# Patient Record
Sex: FEMALE | Race: ASIAN | NOT HISPANIC OR LATINO
[De-identification: names, ages, dates, MRNs, and addresses within clinical notes are randomized per-mention and may not be internally consistent; named-entity substitution may affect disease eponyms.]

---

## 2022-01-24 ENCOUNTER — RESULT REVIEW (OUTPATIENT)
Age: 82
End: 2022-01-24

## 2022-01-26 ENCOUNTER — APPOINTMENT (OUTPATIENT)
Dept: ULTRASOUND IMAGING | Facility: IMAGING CENTER | Age: 82
End: 2022-01-26
Payer: MEDICARE

## 2022-01-26 ENCOUNTER — OUTPATIENT (OUTPATIENT)
Dept: OUTPATIENT SERVICES | Facility: HOSPITAL | Age: 82
LOS: 1 days | End: 2022-01-26
Payer: COMMERCIAL

## 2022-01-26 DIAGNOSIS — Z00.8 ENCOUNTER FOR OTHER GENERAL EXAMINATION: ICD-10-CM

## 2022-01-26 PROCEDURE — C1739: CPT

## 2022-01-26 PROCEDURE — 19281 PERQ DEVICE BREAST 1ST IMAG: CPT | Mod: RT

## 2022-01-26 PROCEDURE — 19281 PERQ DEVICE BREAST 1ST IMAG: CPT

## 2022-01-31 ENCOUNTER — OUTPATIENT (OUTPATIENT)
Dept: OUTPATIENT SERVICES | Facility: HOSPITAL | Age: 82
LOS: 1 days | End: 2022-01-31
Payer: MEDICARE

## 2022-01-31 VITALS
WEIGHT: 160.06 LBS | HEIGHT: 62 IN | SYSTOLIC BLOOD PRESSURE: 120 MMHG | HEART RATE: 87 BPM | OXYGEN SATURATION: 99 % | DIASTOLIC BLOOD PRESSURE: 66 MMHG | TEMPERATURE: 99 F | RESPIRATION RATE: 16 BRPM

## 2022-01-31 DIAGNOSIS — N63.10 UNSPECIFIED LUMP IN THE RIGHT BREAST, UNSPECIFIED QUADRANT: ICD-10-CM

## 2022-01-31 DIAGNOSIS — E11.9 TYPE 2 DIABETES MELLITUS WITHOUT COMPLICATIONS: ICD-10-CM

## 2022-01-31 DIAGNOSIS — Z90.12 ACQUIRED ABSENCE OF LEFT BREAST AND NIPPLE: Chronic | ICD-10-CM

## 2022-01-31 DIAGNOSIS — Z01.812 ENCOUNTER FOR PREPROCEDURAL LABORATORY EXAMINATION: ICD-10-CM

## 2022-01-31 DIAGNOSIS — I10 ESSENTIAL (PRIMARY) HYPERTENSION: ICD-10-CM

## 2022-01-31 PROBLEM — Z00.00 ENCOUNTER FOR PREVENTIVE HEALTH EXAMINATION: Status: ACTIVE | Noted: 2022-01-31

## 2022-01-31 LAB
A1C WITH ESTIMATED AVERAGE GLUCOSE RESULT: 6.7 % — HIGH (ref 4–5.6)
ANION GAP SERPL CALC-SCNC: 10 MMOL/L — SIGNIFICANT CHANGE UP (ref 7–14)
BUN SERPL-MCNC: 20 MG/DL — SIGNIFICANT CHANGE UP (ref 7–23)
CALCIUM SERPL-MCNC: 9.5 MG/DL — SIGNIFICANT CHANGE UP (ref 8.4–10.5)
CHLORIDE SERPL-SCNC: 102 MMOL/L — SIGNIFICANT CHANGE UP (ref 98–107)
CO2 SERPL-SCNC: 25 MMOL/L — SIGNIFICANT CHANGE UP (ref 22–31)
CREAT SERPL-MCNC: 1.03 MG/DL — SIGNIFICANT CHANGE UP (ref 0.5–1.3)
ESTIMATED AVERAGE GLUCOSE: 146 — SIGNIFICANT CHANGE UP
GLUCOSE SERPL-MCNC: 59 MG/DL — LOW (ref 70–99)
HCT VFR BLD CALC: 40.4 % — SIGNIFICANT CHANGE UP (ref 34.5–45)
HGB BLD-MCNC: 13.1 G/DL — SIGNIFICANT CHANGE UP (ref 11.5–15.5)
MCHC RBC-ENTMCNC: 29.5 PG — SIGNIFICANT CHANGE UP (ref 27–34)
MCHC RBC-ENTMCNC: 32.4 GM/DL — SIGNIFICANT CHANGE UP (ref 32–36)
MCV RBC AUTO: 91 FL — SIGNIFICANT CHANGE UP (ref 80–100)
NRBC # BLD: 0 /100 WBCS — SIGNIFICANT CHANGE UP
NRBC # FLD: 0 K/UL — SIGNIFICANT CHANGE UP
PLATELET # BLD AUTO: 196 K/UL — SIGNIFICANT CHANGE UP (ref 150–400)
POTASSIUM SERPL-MCNC: 4.3 MMOL/L — SIGNIFICANT CHANGE UP (ref 3.5–5.3)
POTASSIUM SERPL-SCNC: 4.3 MMOL/L — SIGNIFICANT CHANGE UP (ref 3.5–5.3)
RBC # BLD: 4.44 M/UL — SIGNIFICANT CHANGE UP (ref 3.8–5.2)
RBC # FLD: 12.8 % — SIGNIFICANT CHANGE UP (ref 10.3–14.5)
SODIUM SERPL-SCNC: 137 MMOL/L — SIGNIFICANT CHANGE UP (ref 135–145)
WBC # BLD: 8.58 K/UL — SIGNIFICANT CHANGE UP (ref 3.8–10.5)
WBC # FLD AUTO: 8.58 K/UL — SIGNIFICANT CHANGE UP (ref 3.8–10.5)

## 2022-01-31 PROCEDURE — 93010 ELECTROCARDIOGRAM REPORT: CPT

## 2022-01-31 NOTE — H&P PST ADULT - PROBLEM SELECTOR PLAN 2
Pt instructed to take  on morning of surgery. Pt instructed to take amlodipine on morning of surgery.  LANDRY precautions recommended.  Pt with multiple comorbidities - medical eval requested  Pending copy of report & copy of most recent ECHO

## 2022-01-31 NOTE — H&P PST ADULT - NSICDXPASTMEDICALHX_GEN_ALL_CORE_FT
PAST MEDICAL HISTORY:  Dyslipidemia     Hypertension     Malignant neoplasm of breast h/o left - NO B/P NO BLOOD DRAW NO IV LEFT ARM    Overactive bladder     Type 2 diabetes mellitus     Unspecified lump in the right breast, unspecified quadrant

## 2022-01-31 NOTE — H&P PST ADULT - HISTORY OF PRESENT ILLNESS
82y/o female presents for preop eval for scheduled biopsy right breast with ETHAN  localization.   82y/o female presents for preop eval for scheduled biopsy right breast with ETHAN  localization.  Pt states routine mammogram few months ago with abnormal finding in right breast.  Repeat mammogram and sonogram done and referred to breast specialist.  MRI and biopsy done pt states benign.  Preop dx unspecified lump in the right breast.

## 2022-01-31 NOTE — H&P PST ADULT - PROBLEM SELECTOR PLAN 3
Fs on admit  Pt  instructed last dose on Fs on admit  Pt  instructed last dose Januvia & glimepiride on 2/6/22

## 2022-01-31 NOTE — H&P PST ADULT - NSANTHOSAYNRD_GEN_A_CORE
No. LANDRY screening performed.  STOP BANG Legend: 0-2 = LOW Risk; 3-4 = INTERMEDIATE Risk; 5-8 = HIGH Risk

## 2022-01-31 NOTE — H&P PST ADULT - PROBLEM SELECTOR PLAN 1
Written & verbal preop instructions, gi prophylaxis & surgical soap given  Pt verbalized good understanding.  Teach back done on surgical soap instructions. Scheduled for biopsy right breast with ETHAN  localization on 2/7/2022.  Written & verbal preop instructions, gi prophylaxis & surgical soap given  Pt verbalized good understanding.  Teach back done on surgical soap instructions.

## 2022-01-31 NOTE — H&P PST ADULT - NSICDXFAMILYHX_GEN_ALL_CORE_FT
FAMILY HISTORY:  Family history of hypertension    Father  Still living? No  FHx: stroke, Age at diagnosis: Age Unknown

## 2022-02-02 ENCOUNTER — APPOINTMENT (OUTPATIENT)
Dept: MAMMOGRAPHY | Facility: IMAGING CENTER | Age: 82
End: 2022-02-02

## 2022-02-02 PROBLEM — E11.9 TYPE 2 DIABETES MELLITUS WITHOUT COMPLICATIONS: Chronic | Status: ACTIVE | Noted: 2022-01-31

## 2022-02-02 PROBLEM — I10 ESSENTIAL (PRIMARY) HYPERTENSION: Chronic | Status: ACTIVE | Noted: 2022-01-31

## 2022-02-02 PROBLEM — C50.919 MALIGNANT NEOPLASM OF UNSPECIFIED SITE OF UNSPECIFIED FEMALE BREAST: Chronic | Status: ACTIVE | Noted: 2022-01-31

## 2022-02-02 PROBLEM — E78.5 HYPERLIPIDEMIA, UNSPECIFIED: Chronic | Status: ACTIVE | Noted: 2022-01-31

## 2022-02-02 PROBLEM — N32.81 OVERACTIVE BLADDER: Chronic | Status: ACTIVE | Noted: 2022-01-31

## 2022-02-02 PROBLEM — N63.10 UNSPECIFIED LUMP IN THE RIGHT BREAST, UNSPECIFIED QUADRANT: Chronic | Status: ACTIVE | Noted: 2022-01-31

## 2022-02-04 VITALS
OXYGEN SATURATION: 99 % | WEIGHT: 160.06 LBS | HEART RATE: 90 BPM | TEMPERATURE: 98 F | SYSTOLIC BLOOD PRESSURE: 140 MMHG | DIASTOLIC BLOOD PRESSURE: 75 MMHG | HEIGHT: 62 IN | RESPIRATION RATE: 16 BRPM

## 2022-02-04 NOTE — ASU PREOPERATIVE ASSESSMENT, ADULT (IPARK ONLY) - FALL HARM RISK - RISK INTERVENTIONS
Assistance with ambulation/Monitor gait and stability/Reinforce activity limits and safety measures with patient and family/Sit up slowly, dangle for a short time, stand at bedside before walking/Bed in lowest position, wheels locked, appropriate side rails in place/Call bell, personal items and telephone in reach/Instruct patient to call for assistance before getting out of bed or chair/Non-slip footwear when patient is out of bed/[place] to call system/Physically safe environment - no spills, clutter or unnecessary equipment/Purposeful Proactive Rounding/Room/bathroom lighting operational, light cord in reach

## 2022-02-05 ENCOUNTER — TRANSCRIPTION ENCOUNTER (OUTPATIENT)
Age: 82
End: 2022-02-05

## 2022-02-06 ENCOUNTER — TRANSCRIPTION ENCOUNTER (OUTPATIENT)
Age: 82
End: 2022-02-06

## 2022-02-07 ENCOUNTER — OUTPATIENT (OUTPATIENT)
Dept: OUTPATIENT SERVICES | Facility: HOSPITAL | Age: 82
LOS: 1 days | End: 2022-02-07
Payer: COMMERCIAL

## 2022-02-07 ENCOUNTER — RESULT REVIEW (OUTPATIENT)
Age: 82
End: 2022-02-07

## 2022-02-07 ENCOUNTER — APPOINTMENT (OUTPATIENT)
Dept: MAMMOGRAPHY | Facility: IMAGING CENTER | Age: 82
End: 2022-02-07
Payer: MEDICARE

## 2022-02-07 ENCOUNTER — OUTPATIENT (OUTPATIENT)
Dept: OUTPATIENT SERVICES | Facility: HOSPITAL | Age: 82
LOS: 1 days | Discharge: HOME | End: 2022-02-07
Payer: MEDICARE

## 2022-02-07 VITALS
DIASTOLIC BLOOD PRESSURE: 59 MMHG | SYSTOLIC BLOOD PRESSURE: 122 MMHG | TEMPERATURE: 99 F | RESPIRATION RATE: 12 BRPM | HEART RATE: 84 BPM | OXYGEN SATURATION: 96 %

## 2022-02-07 DIAGNOSIS — Z90.12 ACQUIRED ABSENCE OF LEFT BREAST AND NIPPLE: Chronic | ICD-10-CM

## 2022-02-07 DIAGNOSIS — Z00.8 ENCOUNTER FOR OTHER GENERAL EXAMINATION: ICD-10-CM

## 2022-02-07 DIAGNOSIS — N63.10 UNSPECIFIED LUMP IN THE RIGHT BREAST, UNSPECIFIED QUADRANT: ICD-10-CM

## 2022-02-07 LAB — GLUCOSE BLDC GLUCOMTR-MCNC: 111 MG/DL — HIGH (ref 70–99)

## 2022-02-07 PROCEDURE — 88305 TISSUE EXAM BY PATHOLOGIST: CPT | Mod: 26

## 2022-02-07 PROCEDURE — 76098 X-RAY EXAM SURGICAL SPECIMEN: CPT

## 2022-02-07 PROCEDURE — 76098 X-RAY EXAM SURGICAL SPECIMEN: CPT | Mod: 26

## 2022-02-07 RX ORDER — OXYCODONE AND ACETAMINOPHEN 5; 325 MG/1; MG/1
1 TABLET ORAL
Qty: 5 | Refills: 0
Start: 2022-02-07

## 2022-02-07 NOTE — ASU DISCHARGE PLAN (ADULT/PEDIATRIC) - NS MD DC FALL RISK RISK
For information on Fall & Injury Prevention, visit: [email or web address] OR  [email or web address] OR  https://www.cdc.gov/steadi/patient.html

## 2022-02-07 NOTE — ASU DISCHARGE PLAN (ADULT/PEDIATRIC) - CARE PROVIDER_API CALL
Sharonda Coelho  SURGERY  [address] [place] [address]  Phone: [phone]  Fax: [phone]  Follow Up Time: 1 week

## 2022-02-07 NOTE — ASU DISCHARGE PLAN (ADULT/PEDIATRIC) - ASU DC SPECIAL INSTRUCTIONSFT
Patient may apply an ice pack to the right breast for the first 24 hours to help with swelling.     Patient is being prescribed 5 tabs of Percocet which can be taken every 4 hours for severe pain if needed. Otherwise patient may take Tylenol every 6 hours for pain. Do not exceed more than 4000mg of Tylenol in one 24 hour setting.    Patient should keep dressing on and can shower over dressing. Dressing should stay on until patient sees Dr. Coelho in office in 1 week.

## 2022-02-07 NOTE — ASU DISCHARGE PLAN (ADULT/PEDIATRIC) - NURSING INSTRUCTIONS
Progress to regular diet as tolerated.  Keep well hydrated.   When taking pain/narcotic medications - take with food as it can cause nausea if taken on an empty stomach, and know it may cause constipation. To prevent constipation increase fluids and fiber in diet. You may take stool softeners (such as Colace) and follow directions as printed on bottle. - Do NOT drive while on narcotics.

## 2022-02-15 LAB — SURGICAL PATHOLOGY STUDY: SIGNIFICANT CHANGE UP

## 2022-07-26 NOTE — H&P PST ADULT - VASCULAR
detailed exam Minoxidil Counseling: Minoxidil is a topical medication which can increase blood flow where it is applied. It is uncertain how this medication increases hair growth. Side effects are uncommon and include stinging and allergic reactions.

## 2023-04-06 ENCOUNTER — NON-APPOINTMENT (OUTPATIENT)
Age: 83
End: 2023-04-06

## 2023-04-10 ENCOUNTER — APPOINTMENT (OUTPATIENT)
Dept: GYNECOLOGIC ONCOLOGY | Facility: CLINIC | Age: 83
End: 2023-04-10
Payer: MEDICARE

## 2023-04-10 VITALS
HEIGHT: 63 IN | WEIGHT: 168 LBS | BODY MASS INDEX: 29.77 KG/M2 | DIASTOLIC BLOOD PRESSURE: 75 MMHG | HEART RATE: 80 BPM | SYSTOLIC BLOOD PRESSURE: 136 MMHG

## 2023-04-10 DIAGNOSIS — Z78.9 OTHER SPECIFIED HEALTH STATUS: ICD-10-CM

## 2023-04-10 PROCEDURE — 99204 OFFICE O/P NEW MOD 45 MIN: CPT

## 2023-04-10 NOTE — PAST MEDICAL HISTORY
[Total Preg ___] : G[unfilled] [Postmenopausal] : The patient is postmenopausal [Menarche Age ____] : age at menarche was [unfilled] [Menopause Age____] : age at menopause was [unfilled] [Living ___] : Living: [unfilled]

## 2023-04-10 NOTE — HISTORY OF PRESENT ILLNESS
[FreeTextEntry1] : Referred by (GYN) Dr. Bridges\par PCP: Dr. Devicka Persaud\par Breast Surgeon: Dr. Coelho\par \par Ms. KAY is a 82 year old  postmenopausal female, referred from Dr. Bridges, with a new diagnosis of endometrial cancer.  She had vaginal bleeding that started over 4 months ago.  Per patient she had an in office US, revealing a thickened endometrial lining.  \par \par 3/25/23- Endometrial Biopsy- endometrial adenocarcinoma grade 1\par \par PMHx- HLD, diabetes, depression\par PSHx- Left mastectomy for breast cancer in  (no reconstruction), she took Tamoxifen for 2 years\par Family hx of cancer- none\par   \par She denies pelvic pain/pressure. She denies bloating, abdominal distention or change in bowel or urinary habits.  She denies recent weight changes.  She denies nausea/vomiting.  She has vaginal bleeding, but denies vaginal discharge.  She denies all other associated signs and symptoms.  She is here today to discuss further surgical management. \par \par HM\par Pap- 2023- negative, HRHPV (-)\par Mammo-  breast health followed by Dr. Coelho Intraductal papilloma removed in \par Colonoscopy- denies

## 2023-04-10 NOTE — DISCUSSION/SUMMARY
[Reviewed Clinical Lab Test(s)] : Results of clinical tests were reviewed. [Pre Op] : The differential diagnosis was discussed in detail. The indications, risks, benefits and alternatives were discussed. [unfilled] expressed an understanding of the treatment rationale and her questions were answered to her apparent satisfaction. [FreeTextEntry1] : 82 year old with newly diagnosed grade 1 endometrial cancer\par \par I discussed treatment recommendations with Ms. KAY and her daughters in detail.\par I am recommending hysterectomy/BSO/lymphadenectomy, which is standard management of endometrial cancer.  We discussed role of sentinel lymph node mapping.\par \par The plan is  for minimally invasive robotic approach.  Risks of surgery including bleeding/blood transfusion/infection/injury to bowel/bladder/ureter/blood vessels/conversion to laparotomy were discussed.  Alteratives to management include radiation and chemotherapy.  We discussed possibility of postoperative adjuvant therapy, radiation or chemotherapy/radiation.    Postoperative expectations and recovery were discussed in detail.\par \par She will be referred for preoperative medical clearance.  I answered her questions to her apparent satisfaction.  She would like to proceed with surgery and will be scheduled in the near future.\par \par \par

## 2023-04-13 ENCOUNTER — OUTPATIENT (OUTPATIENT)
Dept: OUTPATIENT SERVICES | Facility: HOSPITAL | Age: 83
LOS: 1 days | End: 2023-04-13
Payer: MEDICARE

## 2023-04-13 DIAGNOSIS — R87.619 UNSPECIFIED ABNORMAL CYTOLOGICAL FINDINGS IN SPECIMENS FROM CERVIX UTERI: ICD-10-CM

## 2023-04-13 DIAGNOSIS — N84.1 POLYP OF CERVIX UTERI: ICD-10-CM

## 2023-04-13 DIAGNOSIS — Z90.12 ACQUIRED ABSENCE OF LEFT BREAST AND NIPPLE: Chronic | ICD-10-CM

## 2023-04-17 ENCOUNTER — TRANSCRIPTION ENCOUNTER (OUTPATIENT)
Age: 83
End: 2023-04-17

## 2023-04-17 ENCOUNTER — RESULT REVIEW (OUTPATIENT)
Age: 83
End: 2023-04-17

## 2023-04-17 PROCEDURE — 88321 CONSLTJ&REPRT SLD PREP ELSWR: CPT

## 2023-04-19 LAB — SURGICAL PATHOLOGY STUDY: SIGNIFICANT CHANGE UP

## 2023-04-28 ENCOUNTER — NON-APPOINTMENT (OUTPATIENT)
Age: 83
End: 2023-04-28

## 2023-05-11 ENCOUNTER — APPOINTMENT (OUTPATIENT)
Dept: GYNECOLOGIC ONCOLOGY | Facility: HOSPITAL | Age: 83
End: 2023-05-11

## 2023-06-16 ENCOUNTER — NON-APPOINTMENT (OUTPATIENT)
Age: 83
End: 2023-06-16

## 2023-06-19 ENCOUNTER — NON-APPOINTMENT (OUTPATIENT)
Age: 83
End: 2023-06-19

## 2023-08-03 ENCOUNTER — OUTPATIENT (OUTPATIENT)
Dept: OUTPATIENT SERVICES | Facility: HOSPITAL | Age: 83
LOS: 1 days | End: 2023-08-03
Payer: MEDICARE

## 2023-08-03 VITALS
WEIGHT: 162.92 LBS | DIASTOLIC BLOOD PRESSURE: 67 MMHG | RESPIRATION RATE: 18 BRPM | HEART RATE: 77 BPM | SYSTOLIC BLOOD PRESSURE: 110 MMHG | HEIGHT: 62 IN | OXYGEN SATURATION: 98 % | TEMPERATURE: 97 F

## 2023-08-03 DIAGNOSIS — E11.9 TYPE 2 DIABETES MELLITUS WITHOUT COMPLICATIONS: ICD-10-CM

## 2023-08-03 DIAGNOSIS — C54.1 MALIGNANT NEOPLASM OF ENDOMETRIUM: ICD-10-CM

## 2023-08-03 DIAGNOSIS — N32.81 OVERACTIVE BLADDER: ICD-10-CM

## 2023-08-03 DIAGNOSIS — Z90.12 ACQUIRED ABSENCE OF LEFT BREAST AND NIPPLE: Chronic | ICD-10-CM

## 2023-08-03 DIAGNOSIS — I10 ESSENTIAL (PRIMARY) HYPERTENSION: ICD-10-CM

## 2023-08-03 DIAGNOSIS — Z98.890 OTHER SPECIFIED POSTPROCEDURAL STATES: Chronic | ICD-10-CM

## 2023-08-03 LAB
A1C WITH ESTIMATED AVERAGE GLUCOSE RESULT: 6.5 % — HIGH (ref 4–5.6)
ALBUMIN SERPL ELPH-MCNC: 4.6 G/DL — SIGNIFICANT CHANGE UP (ref 3.3–5)
ALP SERPL-CCNC: 79 U/L — SIGNIFICANT CHANGE UP (ref 40–120)
ALT FLD-CCNC: 17 U/L — SIGNIFICANT CHANGE UP (ref 4–33)
ANION GAP SERPL CALC-SCNC: 13 MMOL/L — SIGNIFICANT CHANGE UP (ref 7–14)
AST SERPL-CCNC: 19 U/L — SIGNIFICANT CHANGE UP (ref 4–32)
BILIRUB SERPL-MCNC: 0.3 MG/DL — SIGNIFICANT CHANGE UP (ref 0.2–1.2)
BLD GP AB SCN SERPL QL: NEGATIVE — SIGNIFICANT CHANGE UP
BUN SERPL-MCNC: 16 MG/DL — SIGNIFICANT CHANGE UP (ref 7–23)
CALCIUM SERPL-MCNC: 9.4 MG/DL — SIGNIFICANT CHANGE UP (ref 8.4–10.5)
CHLORIDE SERPL-SCNC: 104 MMOL/L — SIGNIFICANT CHANGE UP (ref 98–107)
CO2 SERPL-SCNC: 24 MMOL/L — SIGNIFICANT CHANGE UP (ref 22–31)
CREAT SERPL-MCNC: 0.98 MG/DL — SIGNIFICANT CHANGE UP (ref 0.5–1.3)
EGFR: 57 ML/MIN/1.73M2 — LOW
ESTIMATED AVERAGE GLUCOSE: 140 — SIGNIFICANT CHANGE UP
GLUCOSE SERPL-MCNC: 62 MG/DL — LOW (ref 70–99)
HCT VFR BLD CALC: 38.3 % — SIGNIFICANT CHANGE UP (ref 34.5–45)
HGB BLD-MCNC: 12.3 G/DL — SIGNIFICANT CHANGE UP (ref 11.5–15.5)
MCHC RBC-ENTMCNC: 29.6 PG — SIGNIFICANT CHANGE UP (ref 27–34)
MCHC RBC-ENTMCNC: 32.1 GM/DL — SIGNIFICANT CHANGE UP (ref 32–36)
MCV RBC AUTO: 92.1 FL — SIGNIFICANT CHANGE UP (ref 80–100)
NRBC # BLD: 0 /100 WBCS — SIGNIFICANT CHANGE UP (ref 0–0)
NRBC # FLD: 0 K/UL — SIGNIFICANT CHANGE UP (ref 0–0)
PLATELET # BLD AUTO: 175 K/UL — SIGNIFICANT CHANGE UP (ref 150–400)
POTASSIUM SERPL-MCNC: 4.3 MMOL/L — SIGNIFICANT CHANGE UP (ref 3.5–5.3)
POTASSIUM SERPL-SCNC: 4.3 MMOL/L — SIGNIFICANT CHANGE UP (ref 3.5–5.3)
PROT SERPL-MCNC: 8.1 G/DL — SIGNIFICANT CHANGE UP (ref 6–8.3)
RBC # BLD: 4.16 M/UL — SIGNIFICANT CHANGE UP (ref 3.8–5.2)
RBC # FLD: 12.7 % — SIGNIFICANT CHANGE UP (ref 10.3–14.5)
RH IG SCN BLD-IMP: POSITIVE — SIGNIFICANT CHANGE UP
SODIUM SERPL-SCNC: 141 MMOL/L — SIGNIFICANT CHANGE UP (ref 135–145)
WBC # BLD: 7.01 K/UL — SIGNIFICANT CHANGE UP (ref 3.8–10.5)
WBC # FLD AUTO: 7.01 K/UL — SIGNIFICANT CHANGE UP (ref 3.8–10.5)

## 2023-08-03 PROCEDURE — 93010 ELECTROCARDIOGRAM REPORT: CPT

## 2023-08-03 RX ORDER — CHLORHEXIDINE GLUCONATE 213 G/1000ML
1 SOLUTION TOPICAL ONCE
Refills: 0 | Status: DISCONTINUED | OUTPATIENT
Start: 2023-08-10 | End: 2023-08-11

## 2023-08-03 RX ORDER — SITAGLIPTIN 50 MG/1
1 TABLET, FILM COATED ORAL
Qty: 0 | Refills: 0 | DISCHARGE

## 2023-08-03 RX ORDER — SODIUM CHLORIDE 9 MG/ML
1000 INJECTION, SOLUTION INTRAVENOUS
Refills: 0 | Status: DISCONTINUED | OUTPATIENT
Start: 2023-08-10 | End: 2023-08-11

## 2023-08-03 NOTE — H&P PST ADULT - NEGATIVE NEUROLOGICAL SYMPTOMS
WDL
no weakness/no paresthesias/no generalized seizures/no syncope/no tremors/no vertigo/no loss of sensation/no headache

## 2023-08-03 NOTE — H&P PST ADULT - NEGATIVE GENERAL GENITOURINARY SYMPTOMS
no hematuria/no flank pain L/no flank pain R/no dysuria/no urinary hesitancy/normal urinary frequency/no nocturia

## 2023-08-03 NOTE — H&P PST ADULT - NSICDXPASTMEDICALHX_GEN_ALL_CORE_FT
PAST MEDICAL HISTORY:  Dyslipidemia     Hypertension     Malignant neoplasm of breast h/o left - NO B/P NO BLOOD DRAW NO IV LEFT ARM    Malignant neoplasm of endometrium     Osteoarthritis     Overactive bladder     Thyroid nodule     Type 2 diabetes mellitus     Unspecified lump in the right breast, unspecified quadrant

## 2023-08-03 NOTE — H&P PST ADULT - PROBLEM SELECTOR PLAN 1
Patient tentatively scheduled for Robotic assisted total laparoscopic hysterectomy, bilateral salpingo oophorectomy sentinel H node mapping and excision on 8/10/23.  Pre-op instructions provided. Pt given verbal and written instructions with teach back on chlorhexidine shampoo and pepcid. Pt verbalized understanding with return demonstration.     CBC, CMP, T&S, HgbA1c, EKG were done at [facility].  Copy of Echocardiogram and Stress test requested.  Medical evaluation requested (Advanced age, hx of HTN, DM). Patient had an appointment August 2, 2023.

## 2023-08-03 NOTE — H&P PST ADULT - SKIN
Dr. Nino please advise, does patient still need PFT, original order was placed 6/23/21 by Corie Guillen, patient is scheduled to see you 10/12/21, if PFT needed will need to be completed at the hospital  
Regarding service to pulmonary testing order placed 6/23/21. Our PFT machine is down until further notice and we do not have an estimate of when it will be up again at this time. If PFT is still needed, please reorder to be done at the hospital. Thank you.    Message sent to Dr. Nino team as PFT was ordered by Corie Guillen CNP.  
See pulm     Concern for ERAZO, prompting study 6/2021 from JACOB Yusuf    pft when able  
Spoke with patient. Established with Dr. Pickett. Patient will call Dr. Pickett's office to schedule follow up appointment.  Routing message to Dr. Pickett's pool.  
warm and dry/color normal/normal/no rashes/no ulcers

## 2023-08-03 NOTE — H&P PST ADULT - FUNCTIONAL STATUS
METS 4- Able to climb up 2 flights of stairs, walk up hill and do house chores without symptoms./4-10 METS

## 2023-08-03 NOTE — H&P PST ADULT - NEGATIVE ENMT SYMPTOMS
no hearing difficulty/no ear pain/no tinnitus/no vertigo/no sinus symptoms/no nasal congestion/no gum bleeding/no dry mouth/no throat pain/no dysphagia

## 2023-08-03 NOTE — H&P PST ADULT - HISTORY OF PRESENT ILLNESS
82 year old female with pmhx of HTN, DMII, HLD, b/l breast ca s/p left mastectomy (13 years ago), right lumpectomy (2021), presents for pre-op evaluation for diagnosis of Malignant neoplasm of endometrium. Pt is scheduled for Robotic assisted total laparoscopic hysterectomy, bilateral salpingo oophorectomy sentinel H node mapping and excision.  82 year old female with pmhx of HTN, DMII, HLD, b/l breast ca s/p left mastectomy (13 years ago), right lumpectomy (2021), presents for pre-op evaluation for diagnosis of Malignant neoplasm of endometrium. Pt is scheduled for Robotic assisted total laparoscopic hysterectomy, bilateral salpingo oophorectomy sentinel H node mapping and excision. Patient had spotting in March and had Ultrasound which shows endometrial thickening. Patient had a biopsy which shows Endometrial adenocarcinoma grade I.

## 2023-08-04 ENCOUNTER — APPOINTMENT (OUTPATIENT)
Dept: GYNECOLOGIC ONCOLOGY | Facility: CLINIC | Age: 83
End: 2023-08-04
Payer: MEDICARE

## 2023-08-04 VITALS
HEART RATE: 82 BPM | BODY MASS INDEX: 28.17 KG/M2 | SYSTOLIC BLOOD PRESSURE: 131 MMHG | DIASTOLIC BLOOD PRESSURE: 66 MMHG | WEIGHT: 159 LBS

## 2023-08-04 PROCEDURE — 99213 OFFICE O/P EST LOW 20 MIN: CPT

## 2023-08-04 NOTE — DISCUSSION/SUMMARY
[FreeTextEntry1] : Discussed postoperative recovery and expectations Plan for robotic TLH/BSO/SLN Discussed risks/benefits of surgery

## 2023-08-04 NOTE — HISTORY OF PRESENT ILLNESS
[FreeTextEntry1] : 81 yo with FIGO 1 endometrial cancer, initially seen in 4/2023 for consultation  She was scheduled for surgery on 5/11/23, and did not return any calls regarding the surgery.  Contact was finally made in 6/2023 and patient declined surgery or any treatment. She then called back in 7/2023 with desire to schedule surgery, which is scheduled for 8/10/23 No new complaints today

## 2023-08-09 ENCOUNTER — TRANSCRIPTION ENCOUNTER (OUTPATIENT)
Age: 83
End: 2023-08-09

## 2023-08-09 NOTE — ASU PATIENT PROFILE, ADULT - FALL HARM RISK - UNIVERSAL INTERVENTIONS
Bed in lowest position, wheels locked, appropriate side rails in place/Call bell, personal items and telephone in reach/Instruct patient to call for assistance before getting out of bed or chair/Non-slip footwear when patient is out of bed/[place] to call system/Physically safe environment - no spills, clutter or unnecessary equipment/Purposeful Proactive Rounding/Room/bathroom lighting operational, light cord in reach

## 2023-08-09 NOTE — ASU PATIENT PROFILE, ADULT - ABILITY TO HEAR (WITH HEARING AID OR HEARING APPLIANCE IF NORMALLY USED):
mild [place]ild[place] to Moderately Impaired: difficulty hearing in some environments or speaker may need to increase volume or speak distinctly

## 2023-08-10 ENCOUNTER — TRANSCRIPTION ENCOUNTER (OUTPATIENT)
Age: 83
End: 2023-08-10

## 2023-08-10 ENCOUNTER — INPATIENT (INPATIENT)
Facility: HOSPITAL | Age: 83
LOS: 0 days | Discharge: HOME | End: 2023-08-11
Attending: OBSTETRICS & GYNECOLOGY | Admitting: OBSTETRICS & GYNECOLOGY
Payer: MEDICARE

## 2023-08-10 ENCOUNTER — RESULT REVIEW (OUTPATIENT)
Age: 83
End: 2023-08-10

## 2023-08-10 ENCOUNTER — APPOINTMENT (OUTPATIENT)
Dept: GYNECOLOGIC ONCOLOGY | Facility: HOSPITAL | Age: 83
End: 2023-08-10

## 2023-08-10 VITALS
SYSTOLIC BLOOD PRESSURE: 137 MMHG | HEART RATE: 79 BPM | OXYGEN SATURATION: 97 % | HEIGHT: 62 IN | TEMPERATURE: 97 F | RESPIRATION RATE: 18 BRPM | WEIGHT: 162.92 LBS | DIASTOLIC BLOOD PRESSURE: 68 MMHG

## 2023-08-10 DIAGNOSIS — Z98.890 OTHER SPECIFIED POSTPROCEDURAL STATES: Chronic | ICD-10-CM

## 2023-08-10 DIAGNOSIS — C54.1 MALIGNANT NEOPLASM OF ENDOMETRIUM: ICD-10-CM

## 2023-08-10 DIAGNOSIS — Z90.12 ACQUIRED ABSENCE OF LEFT BREAST AND NIPPLE: Chronic | ICD-10-CM

## 2023-08-10 LAB
GLUCOSE BLDC GLUCOMTR-MCNC: 178 MG/DL — HIGH (ref 70–99)
GLUCOSE BLDC GLUCOMTR-MCNC: 182 MG/DL — HIGH (ref 70–99)
GLUCOSE BLDC GLUCOMTR-MCNC: 191 MG/DL — HIGH (ref 70–99)

## 2023-08-10 PROCEDURE — 88307 TISSUE EXAM BY PATHOLOGIST: CPT | Mod: 26

## 2023-08-10 PROCEDURE — 88309 TISSUE EXAM BY PATHOLOGIST: CPT | Mod: 26

## 2023-08-10 PROCEDURE — 38570 LAPAROSCOPY LYMPH NODE BIOP: CPT

## 2023-08-10 PROCEDURE — 88342 IMHCHEM/IMCYTCHM 1ST ANTB: CPT | Mod: 26

## 2023-08-10 PROCEDURE — 88112 CYTOPATH CELL ENHANCE TECH: CPT | Mod: 26

## 2023-08-10 PROCEDURE — 58571 TLH W/T/O 250 G OR LESS: CPT

## 2023-08-10 PROCEDURE — 38900 IO MAP OF SENT LYMPH NODE: CPT | Mod: 50

## 2023-08-10 PROCEDURE — 88341 IMHCHEM/IMCYTCHM EA ADD ANTB: CPT | Mod: 26

## 2023-08-10 PROCEDURE — 58571 TLH W/T/O 250 G OR LESS: CPT | Mod: AS

## 2023-08-10 PROCEDURE — 38571 LAPAROSCOPY LYMPHADENECTOMY: CPT | Mod: AS

## 2023-08-10 RX ORDER — SODIUM CHLORIDE 9 MG/ML
1000 INJECTION, SOLUTION INTRAVENOUS
Refills: 0 | Status: DISCONTINUED | OUTPATIENT
Start: 2023-08-10 | End: 2023-08-11

## 2023-08-10 RX ORDER — ACETAMINOPHEN 500 MG
975 TABLET ORAL EVERY 6 HOURS
Refills: 0 | Status: DISCONTINUED | OUTPATIENT
Start: 2023-08-11 | End: 2023-08-11

## 2023-08-10 RX ORDER — DEXTROSE 50 % IN WATER 50 %
12.5 SYRINGE (ML) INTRAVENOUS ONCE
Refills: 0 | Status: DISCONTINUED | OUTPATIENT
Start: 2023-08-10 | End: 2023-08-11

## 2023-08-10 RX ORDER — SODIUM CHLORIDE 9 MG/ML
3 INJECTION INTRAMUSCULAR; INTRAVENOUS; SUBCUTANEOUS EVERY 8 HOURS
Refills: 0 | Status: DISCONTINUED | OUTPATIENT
Start: 2023-08-10 | End: 2023-08-11

## 2023-08-10 RX ORDER — FENTANYL CITRATE 50 UG/ML
50 INJECTION INTRAVENOUS
Refills: 0 | Status: DISCONTINUED | OUTPATIENT
Start: 2023-08-10 | End: 2023-08-10

## 2023-08-10 RX ORDER — DEXTROSE 50 % IN WATER 50 %
25 SYRINGE (ML) INTRAVENOUS ONCE
Refills: 0 | Status: DISCONTINUED | OUTPATIENT
Start: 2023-08-10 | End: 2023-08-11

## 2023-08-10 RX ORDER — SIMVASTATIN 20 MG/1
1 TABLET, FILM COATED ORAL
Qty: 0 | Refills: 0 | DISCHARGE

## 2023-08-10 RX ORDER — IBUPROFEN 200 MG
600 TABLET ORAL EVERY 6 HOURS
Refills: 0 | Status: DISCONTINUED | OUTPATIENT
Start: 2023-08-10 | End: 2023-08-11

## 2023-08-10 RX ORDER — GLUCAGON INJECTION, SOLUTION 0.5 MG/.1ML
1 INJECTION, SOLUTION SUBCUTANEOUS ONCE
Refills: 0 | Status: DISCONTINUED | OUTPATIENT
Start: 2023-08-10 | End: 2023-08-11

## 2023-08-10 RX ORDER — DEXTROSE 50 % IN WATER 50 %
15 SYRINGE (ML) INTRAVENOUS ONCE
Refills: 0 | Status: DISCONTINUED | OUTPATIENT
Start: 2023-08-10 | End: 2023-08-11

## 2023-08-10 RX ORDER — GLIMEPIRIDE 1 MG
1 TABLET ORAL
Qty: 0 | Refills: 0 | DISCHARGE

## 2023-08-10 RX ORDER — INSULIN LISPRO 100/ML
VIAL (ML) SUBCUTANEOUS AT BEDTIME
Refills: 0 | Status: DISCONTINUED | OUTPATIENT
Start: 2023-08-10 | End: 2023-08-11

## 2023-08-10 RX ORDER — ONDANSETRON 8 MG/1
4 TABLET, FILM COATED ORAL ONCE
Refills: 0 | Status: COMPLETED | OUTPATIENT
Start: 2023-08-10 | End: 2023-08-10

## 2023-08-10 RX ORDER — SENNA PLUS 8.6 MG/1
2 TABLET ORAL AT BEDTIME
Refills: 0 | Status: DISCONTINUED | OUTPATIENT
Start: 2023-08-10 | End: 2023-08-11

## 2023-08-10 RX ORDER — METOPROLOL TARTRATE 50 MG
5 TABLET ORAL EVERY 6 HOURS
Refills: 0 | Status: DISCONTINUED | OUTPATIENT
Start: 2023-08-10 | End: 2023-08-11

## 2023-08-10 RX ORDER — ASPIRIN/CALCIUM CARB/MAGNESIUM 324 MG
1 TABLET ORAL
Refills: 0 | DISCHARGE

## 2023-08-10 RX ORDER — OXYCODONE HYDROCHLORIDE 5 MG/1
5 TABLET ORAL
Refills: 0 | Status: DISCONTINUED | OUTPATIENT
Start: 2023-08-10 | End: 2023-08-11

## 2023-08-10 RX ORDER — ONDANSETRON 8 MG/1
8 TABLET, FILM COATED ORAL EVERY 8 HOURS
Refills: 0 | Status: DISCONTINUED | OUTPATIENT
Start: 2023-08-10 | End: 2023-08-11

## 2023-08-10 RX ORDER — SIMETHICONE 80 MG/1
80 TABLET, CHEWABLE ORAL EVERY 6 HOURS
Refills: 0 | Status: DISCONTINUED | OUTPATIENT
Start: 2023-08-10 | End: 2023-08-11

## 2023-08-10 RX ORDER — INSULIN LISPRO 100/ML
VIAL (ML) SUBCUTANEOUS
Refills: 0 | Status: DISCONTINUED | OUTPATIENT
Start: 2023-08-10 | End: 2023-08-11

## 2023-08-10 RX ORDER — AMLODIPINE AND VALSARTAN 5; 320 MG/1; MG/1
1 TABLET, FILM COATED ORAL
Qty: 0 | Refills: 0 | DISCHARGE

## 2023-08-10 RX ORDER — HEPARIN SODIUM 5000 [USP'U]/ML
5000 INJECTION INTRAVENOUS; SUBCUTANEOUS EVERY 8 HOURS
Refills: 0 | Status: DISCONTINUED | OUTPATIENT
Start: 2023-08-10 | End: 2023-08-11

## 2023-08-10 RX ORDER — SOLIFENACIN SUCCINATE 10 MG/1
1 TABLET ORAL
Qty: 0 | Refills: 0 | DISCHARGE

## 2023-08-10 RX ADMIN — SODIUM CHLORIDE 30 MILLILITER(S): 9 INJECTION, SOLUTION INTRAVENOUS at 21:35

## 2023-08-10 RX ADMIN — OXYCODONE HYDROCHLORIDE 5 MILLIGRAM(S): 5 TABLET ORAL at 22:35

## 2023-08-10 RX ADMIN — OXYCODONE HYDROCHLORIDE 5 MILLIGRAM(S): 5 TABLET ORAL at 21:35

## 2023-08-10 RX ADMIN — Medication 1: at 18:27

## 2023-08-10 RX ADMIN — ONDANSETRON 4 MILLIGRAM(S): 8 TABLET, FILM COATED ORAL at 17:55

## 2023-08-10 RX ADMIN — SODIUM CHLORIDE 3 MILLILITER(S): 9 INJECTION INTRAMUSCULAR; INTRAVENOUS; SUBCUTANEOUS at 22:28

## 2023-08-10 RX ADMIN — FENTANYL CITRATE 50 MICROGRAM(S): 50 INJECTION INTRAVENOUS at 18:40

## 2023-08-10 RX ADMIN — Medication 600 MILLIGRAM(S): at 22:52

## 2023-08-10 RX ADMIN — FENTANYL CITRATE 50 MICROGRAM(S): 50 INJECTION INTRAVENOUS at 19:00

## 2023-08-10 RX ADMIN — Medication 600 MILLIGRAM(S): at 23:52

## 2023-08-10 RX ADMIN — HEPARIN SODIUM 5000 UNIT(S): 5000 INJECTION INTRAVENOUS; SUBCUTANEOUS at 22:52

## 2023-08-10 NOTE — BRIEF OPERATIVE NOTE - ASSISTANT(S)
Barbara Cisse; Azul Sanchez First Assist: Faizan David PA-C  Second Assists: Barbara Cisse; Azul Sanchez

## 2023-08-10 NOTE — BRIEF OPERATIVE NOTE - OPERATION/FINDINGS
Exam under anesthesia revealed anteverted uterus approximately 6 week size. Small right-sided vaginal stricture. Uterus sounded to 9 cm.   Laparoscopy: Atraumatic entry. Grossly normal bowel and liver edge. Uterus anteverted with small right-sided pedunculated fundal fibroid. Grossly normal tubes and ovaries, left tube with paratubal cyst. Right and left parailiac nodes seen under fluorescent imaging. Excellent hemostasis noted. Exam under anesthesia revealed anteverted uterus approximately 6 week size. Small right-sided vaginal stricture. Uterus sounded to 9 cm.   Laparoscopy: Atraumatic entry. Grossly normal bowel and liver edge. Uterus anteverted with small right-sided pedunculated fundal fibroid. Grossly normal tubes and ovaries, right paratubal cyst. Right and left pelvic nodes seen under fluorescent imaging. Excellent hemostasis noted.

## 2023-08-10 NOTE — BRIEF OPERATIVE NOTE - SPECIMENS
Right sentinel lymph node, left sentinel lymph node, uterus with bilateral fallopian tubes and ovaries

## 2023-08-10 NOTE — BRIEF OPERATIVE NOTE - COMMENTS
I, Faizan David PA-C, served as the first assistant in this operation. I assisted in placing ports, docking, and targeting the da Yajaira robot, first assisted at the surgical field while the surgeon was performing the operation at the robotic console by providing instrument exchanges, tissue retraction, suction and irrigation, specimen retrieval, passing and removing sutures and sponges, undocking the robotic platform, and closed surgical wounds.

## 2023-08-10 NOTE — BRIEF OPERATIVE NOTE - DISPOSITION
"Neuro: Disoriented to time/situation. Pupils equal. Sometimes appear \"dysconjugate\" when pt is just waking up but track. Follows commands. Oxy and tylenol given for pain. Melatonin given for sleep per patient request.  CV: Sinus rhythm, occasional PVCs. BP stable. Afebrile but pt states she is cold.   Pulm: Shortness of breath/RR improved around 2100. PA from [facility] team assessed pt and had no new orders/suggestions at this time. Lungs clear/crackles in bases. NC 3L.   GI/: Hypo bowel sounds. Voided small amounts (~25mL) in commode. HD from 5649-8114, 3L taken off.  Skin: Generalized bruising/redness.   Mobility: Up with walker and standby assistance.     Report called to 6B RN. Patient transferred.     " PACU - floor

## 2023-08-10 NOTE — BRIEF OPERATIVE NOTE - NSICDXBRIEFPROCEDURE_GEN_ALL_CORE_FT
PROCEDURES:  Hysterectomy, total, robot-assisted, laparoscopic, using da Yajaira Xi, with bilateral salpingo-oophorectomy and sentinel lymph node biopsy 10-Aug-2023 20:17:16  Azul Sanchez

## 2023-08-10 NOTE — PATIENT PROFILE ADULT - FALL HARM RISK - RISK INTERVENTIONS
Assistance OOB with selected safe patient handling equipment/Assistance with ambulation/Communicate Fall Risk and Risk Factors to all staff, patient, and family/Discuss with provider need for PT consult/Monitor gait and stability/Provide patient with walking aids - walker, cane, crutches/Reinforce activity limits and safety measures with patient and family/Sit up slowly, dangle for a short time, stand at bedside before walking/Use of alarms - bed, chair and/or voice tab/Visual Cue: Yellow wristband/Bed in lowest position, wheels locked, appropriate side rails in place/Call bell, personal items and telephone in reach/Instruct patient to call for assistance before getting out of bed or chair/Non-slip footwear when patient is out of bed/[place] to call system/Physically safe environment - no spills, clutter or unnecessary equipment/Purposeful Proactive Rounding/Room/bathroom lighting operational, light cord in reach

## 2023-08-10 NOTE — CHART NOTE - NSCHARTNOTEFT_GEN_A_CORE
Patient seen and examined at bedside, recently post-op. No acute complaints at this time. Pain is well controlled, had nausea but now relieved by antiemetics. Haines catheter has been in place since surgery. Not yet OOB. Denies CP, SOB, N/V, fevers, and chills.    Vital Signs Last 24 Hours  T(C): 36.9 (08-10-23 @ 19:00), Max: 36.9 (08-10-23 @ 19:00)  HR: 93 (08-10-23 @ 19:00) (79 - 93)  BP: 149/71 (08-10-23 @ 19:00) (126/56 - 149/71)  RR: 20 (08-10-23 @ 19:00) (15 - 20)  SpO2: 100% (08-10-23 @ 19:00) (97% - 100%)    I&O's Summary    10 Aug 2023 07:01  -  10 Aug 2023 19:20  --------------------------------------------------------  IN: 0 mL / OUT: 285 mL / NET: -285 mL      Physical Exam:  General: NAD  CV: RRR  Lungs: normal respiratory effort on room air  Abdomen: Soft, non-tender, non-distended  Incision: 4 LSC port sites CDI with steris and overlying dressings  Ext: No pain or swelling    MEDICATIONS  (STANDING):  chlorhexidine 2% Cloths 1 Application(s) Topical once  dextrose 5%. 1000 milliLiter(s) (100 mL/Hr) IV Continuous <Continuous>  dextrose 5%. 1000 milliLiter(s) (50 mL/Hr) IV Continuous <Continuous>  dextrose 50% Injectable 25 Gram(s) IV Push once  dextrose 50% Injectable 12.5 Gram(s) IV Push once  dextrose 50% Injectable 25 Gram(s) IV Push once  glucagon  Injectable 1 milliGRAM(s) IntraMuscular once  heparin   Injectable 5000 Unit(s) SubCutaneous every 8 hours  ibuprofen  Tablet. 600 milliGRAM(s) Oral every 6 hours  insulin lispro (ADMELOG) corrective regimen sliding scale   SubCutaneous three times a day before meals  insulin lispro (ADMELOG) corrective regimen sliding scale   SubCutaneous at bedtime  lactated ringers. 1000 milliLiter(s) (30 mL/Hr) IV Continuous <Continuous>  sodium chloride 0.9% lock flush 3 milliLiter(s) IV Push every 8 hours    MEDICATIONS  (PRN):  dextrose Oral Gel 15 Gram(s) Oral once PRN Blood Glucose LESS THAN 70 milliGRAM(s)/deciliter  fentaNYL    Injectable 50 MICROGram(s) IV Push every 5 minutes PRN Severe Pain (7 - 10)  metoprolol tartrate Injectable 5 milliGRAM(s) IV Push every 6 hours PRN SBP >/= 160 or DBP >/= 110  ondansetron    Tablet 8 milliGRAM(s) Oral every 8 hours PRN Nausea and/or Vomiting  oxyCODONE    IR 5 milliGRAM(s) Oral every 3 hours PRN Severe Pain (7 - 10)  senna 2 Tablet(s) Oral at bedtime PRN Constipation  simethicone 80 milliGRAM(s) Chew every 6 hours PRN Gas      83yo s/p RA-TLH, BSO, SLNMD, uncomplicated though with lymph node dissection in close proximity to ureter. Out of abundance of caution, haines left in place for several hours postop, will keep patient inpatient overnight to ensure she can void spontaneously prior to discharge home. Pt otherwise recovering well in acute post-operative state.    Neuro: Pain controlled.  CV: Hemodynamically stable  Pulm: Encourage oob/ambulation, incentive spirometer at bedside  GI: Advance diet as tolerated  : Haines removed at bedside. DTV@330a  Heme: HSQ and SCDs for DVT PPX  ID: afebrile  Endo: no active issues  Dispo: continue routine post-op care, inpatient for continued monitoring    Graeme Katz PGY2  d/w Dr. Cisse GYN Onc fellow

## 2023-08-11 ENCOUNTER — TRANSCRIPTION ENCOUNTER (OUTPATIENT)
Age: 83
End: 2023-08-11

## 2023-08-11 VITALS
HEART RATE: 78 BPM | DIASTOLIC BLOOD PRESSURE: 40 MMHG | TEMPERATURE: 98 F | RESPIRATION RATE: 18 BRPM | SYSTOLIC BLOOD PRESSURE: 101 MMHG | OXYGEN SATURATION: 95 %

## 2023-08-11 DIAGNOSIS — Z98.890 OTHER SPECIFIED POSTPROCEDURAL STATES: ICD-10-CM

## 2023-08-11 LAB
ANION GAP SERPL CALC-SCNC: 10 MMOL/L — SIGNIFICANT CHANGE UP (ref 7–14)
BASOPHILS # BLD AUTO: 0.01 K/UL — SIGNIFICANT CHANGE UP (ref 0–0.2)
BASOPHILS NFR BLD AUTO: 0.1 % — SIGNIFICANT CHANGE UP (ref 0–2)
BUN SERPL-MCNC: 18 MG/DL — SIGNIFICANT CHANGE UP (ref 7–23)
CALCIUM SERPL-MCNC: 9.1 MG/DL — SIGNIFICANT CHANGE UP (ref 8.4–10.5)
CHLORIDE SERPL-SCNC: 100 MMOL/L — SIGNIFICANT CHANGE UP (ref 98–107)
CO2 SERPL-SCNC: 23 MMOL/L — SIGNIFICANT CHANGE UP (ref 22–31)
CREAT SERPL-MCNC: 0.87 MG/DL — SIGNIFICANT CHANGE UP (ref 0.5–1.3)
EGFR: 66 ML/MIN/1.73M2 — SIGNIFICANT CHANGE UP
EOSINOPHIL # BLD AUTO: 0 K/UL — SIGNIFICANT CHANGE UP (ref 0–0.5)
EOSINOPHIL NFR BLD AUTO: 0 % — SIGNIFICANT CHANGE UP (ref 0–6)
GLUCOSE BLDC GLUCOMTR-MCNC: 122 MG/DL — HIGH (ref 70–99)
GLUCOSE BLDC GLUCOMTR-MCNC: 179 MG/DL — HIGH (ref 70–99)
GLUCOSE SERPL-MCNC: 148 MG/DL — HIGH (ref 70–99)
HCT VFR BLD CALC: 35.8 % — SIGNIFICANT CHANGE UP (ref 34.5–45)
HGB BLD-MCNC: 11.9 G/DL — SIGNIFICANT CHANGE UP (ref 11.5–15.5)
IANC: 7.69 K/UL — HIGH (ref 1.8–7.4)
IMM GRANULOCYTES NFR BLD AUTO: 0.3 % — SIGNIFICANT CHANGE UP (ref 0–0.9)
LYMPHOCYTES # BLD AUTO: 1.12 K/UL — SIGNIFICANT CHANGE UP (ref 1–3.3)
LYMPHOCYTES # BLD AUTO: 12.3 % — LOW (ref 13–44)
MAGNESIUM SERPL-MCNC: 1.9 MG/DL — SIGNIFICANT CHANGE UP (ref 1.6–2.6)
MCHC RBC-ENTMCNC: 29.6 PG — SIGNIFICANT CHANGE UP (ref 27–34)
MCHC RBC-ENTMCNC: 33.2 GM/DL — SIGNIFICANT CHANGE UP (ref 32–36)
MCV RBC AUTO: 89.1 FL — SIGNIFICANT CHANGE UP (ref 80–100)
MONOCYTES # BLD AUTO: 0.23 K/UL — SIGNIFICANT CHANGE UP (ref 0–0.9)
MONOCYTES NFR BLD AUTO: 2.5 % — SIGNIFICANT CHANGE UP (ref 2–14)
NEUTROPHILS # BLD AUTO: 7.69 K/UL — HIGH (ref 1.8–7.4)
NEUTROPHILS NFR BLD AUTO: 84.8 % — HIGH (ref 43–77)
NON-GYNECOLOGICAL CYTOLOGY STUDY: SIGNIFICANT CHANGE UP
NRBC # BLD: 0 /100 WBCS — SIGNIFICANT CHANGE UP (ref 0–0)
NRBC # FLD: 0 K/UL — SIGNIFICANT CHANGE UP (ref 0–0)
PHOSPHATE SERPL-MCNC: 3.5 MG/DL — SIGNIFICANT CHANGE UP (ref 2.5–4.5)
PLATELET # BLD AUTO: 161 K/UL — SIGNIFICANT CHANGE UP (ref 150–400)
POTASSIUM SERPL-MCNC: 4.4 MMOL/L — SIGNIFICANT CHANGE UP (ref 3.5–5.3)
POTASSIUM SERPL-SCNC: 4.4 MMOL/L — SIGNIFICANT CHANGE UP (ref 3.5–5.3)
RBC # BLD: 4.02 M/UL — SIGNIFICANT CHANGE UP (ref 3.8–5.2)
RBC # FLD: 12.7 % — SIGNIFICANT CHANGE UP (ref 10.3–14.5)
SODIUM SERPL-SCNC: 133 MMOL/L — LOW (ref 135–145)
WBC # BLD: 9.08 K/UL — SIGNIFICANT CHANGE UP (ref 3.8–10.5)
WBC # FLD AUTO: 9.08 K/UL — SIGNIFICANT CHANGE UP (ref 3.8–10.5)

## 2023-08-11 RX ORDER — SIMETHICONE 80 MG/1
80 TABLET, CHEWABLE ORAL
Refills: 0 | Status: DISCONTINUED | OUTPATIENT
Start: 2023-08-11 | End: 2023-08-11

## 2023-08-11 RX ORDER — OXYCODONE HYDROCHLORIDE 5 MG/1
1 TABLET ORAL
Qty: 8 | Refills: 0
Start: 2023-08-11

## 2023-08-11 RX ORDER — SIMETHICONE 80 MG/1
1 TABLET, CHEWABLE ORAL
Qty: 0 | Refills: 0 | DISCHARGE
Start: 2023-08-11

## 2023-08-11 RX ORDER — IBUPROFEN 200 MG
1 TABLET ORAL
Qty: 0 | Refills: 0 | DISCHARGE

## 2023-08-11 RX ORDER — ACETAMINOPHEN 500 MG
3 TABLET ORAL
Qty: 0 | Refills: 0 | DISCHARGE

## 2023-08-11 RX ADMIN — Medication 600 MILLIGRAM(S): at 06:26

## 2023-08-11 RX ADMIN — SODIUM CHLORIDE 30 MILLILITER(S): 9 INJECTION, SOLUTION INTRAVENOUS at 05:26

## 2023-08-11 RX ADMIN — SODIUM CHLORIDE 3 MILLILITER(S): 9 INJECTION INTRAMUSCULAR; INTRAVENOUS; SUBCUTANEOUS at 13:41

## 2023-08-11 RX ADMIN — Medication 600 MILLIGRAM(S): at 12:36

## 2023-08-11 RX ADMIN — Medication 975 MILLIGRAM(S): at 09:51

## 2023-08-11 RX ADMIN — SIMETHICONE 80 MILLIGRAM(S): 80 TABLET, CHEWABLE ORAL at 11:37

## 2023-08-11 RX ADMIN — Medication 975 MILLIGRAM(S): at 01:32

## 2023-08-11 RX ADMIN — Medication 600 MILLIGRAM(S): at 11:36

## 2023-08-11 RX ADMIN — Medication 975 MILLIGRAM(S): at 02:32

## 2023-08-11 RX ADMIN — Medication 975 MILLIGRAM(S): at 08:51

## 2023-08-11 RX ADMIN — Medication 600 MILLIGRAM(S): at 05:26

## 2023-08-11 RX ADMIN — SODIUM CHLORIDE 3 MILLILITER(S): 9 INJECTION INTRAMUSCULAR; INTRAVENOUS; SUBCUTANEOUS at 06:18

## 2023-08-11 RX ADMIN — Medication 1: at 11:40

## 2023-08-11 RX ADMIN — HEPARIN SODIUM 5000 UNIT(S): 5000 INJECTION INTRAVENOUS; SUBCUTANEOUS at 06:31

## 2023-08-11 NOTE — DISCHARGE NOTE PROVIDER - NSRESEARCHGRANT_PROPHYLAXISRECOMFT_GEN_A_CORE
Dressing: no dressing applied Electrodesiccation Text: The wound bed was treated with electrodesiccation after the biopsy was performed. Type Of Destruction Used: Curettage Bill 41300 For Specimen Handling/Conveyance To Laboratory?: no Render Post-Care Instructions In Note?: yes Anesthesia Volume In Cc (Will Not Render If 0): 0.5 Silver Nitrate Text: The wound bed was treated with silver nitrate after the biopsy was performed. Detail Level: Simple Consent: Verbal consent was obtained and risks were reviewed including but not limited to scarring, infection, bleeding, scabbing, incomplete removal, nerve damage and allergy to anesthesia. Electrodesiccation And Curettage Text: The wound bed was treated with electrodesiccation and curettage after the biopsy was performed. Post-Care Instructions: I reviewed with the patient in detail post-care instructions. Patient is to keep the biopsy site dry overnight, and then apply Aquaphor Ointment twice daily until healed. Biopsy Method: Dermablade Path Notes (To The Dermatopathologist): Check margins Hemostasis: Electrocautery Notification Instructions: Patient will be notified of biopsy results. However, patient instructed to call the office if not contacted within 2 weeks. Wound Care: Aquaphor X Size Of Lesion In Cm: 0 Cryotherapy Text: The wound bed was treated with cryotherapy after the biopsy was performed. Lab Facility: [id number] Size Of Lesion In Cm: 0.3 Biopsy Type: H and E Anesthesia Type: 1% lidocaine with 1:100,000 epinephrine Billing Type: Third-Party Bill This is a surgical and/or non-medical patient. Lab: [id number] Body Location Override (Optional - Billing Will Still Be Based On Selected Body Map Location If Applicable): Left helix

## 2023-08-11 NOTE — PROGRESS NOTE ADULT - ATTENDING COMMENTS
Patient seen and examined at bedside with team, agree with above note, including assessment and plan. Abdomen benign. Discussed today's plan of care with patient, all questions answered. Continue routine postop care., d/c home.

## 2023-08-11 NOTE — DISCHARGE NOTE NURSING/CASE MANAGEMENT/SOCIAL WORK - NSDCPEFALRISK_GEN_ALL_CORE
For information on Fall & Injury Prevention, visit: [email or web address] OR  [email or web address] OR  https://www.cdc.gov/steadi/patient.html

## 2023-08-11 NOTE — DISCHARGE NOTE PROVIDER - NSDCCPCAREPLAN_GEN_ALL_CORE_FT
PRINCIPAL DISCHARGE DIAGNOSIS  Diagnosis: Endometrial carcinoma  Assessment and Plan of Treatment:      PRINCIPAL DISCHARGE DIAGNOSIS  Diagnosis: Endometrial carcinoma  Assessment and Plan of Treatment: WILBER LOCKE, MARVINO

## 2023-08-11 NOTE — DISCHARGE NOTE PROVIDER - HOSPITAL COURSE
Patient underwent an uncomplicated RA-TLH, BSO, SLNMD for endometrial carcinoma, FIGO grade 1. EBL: 50. Hct: 38.3.     POD#0 Pain was well controlled with oral analgesics and patient tolerated regular diet. Kilgore was discontinued and pt voided spontaneously.     POD#1 No acute events overnight.    Upon discharge on POD#1, the patient is ambulating, voiding spontaneously, tolerating oral intake, pain was well controlled with oral medication, and vital signs were stable. Patient to have close follow up with Dr. Dietrich on 8/25 at 2:00pm. This 81 yo female s/p robotic assisted total laparoscopic hysterectomy, bilateral salpingooophorectomy, SLND for known endometrial cancer FIGO I; EBL: 50cc with frozen section (see operative note for details of procedure). Patient was extubated in the OR and transferred to PACU in a hemodynamically stable condition. SQ Heparin for DVT prophylaxis. Her Kilgore was discontinued and she was able to void spontaneously. Patient was tolerating some fluids  On POD#1, patient was out of bed to chair. Patient's pain was controlled on PO meds. Patient diet advanced as tolerated to regular, which was tolerated without nausea/vomiting. A Physical Therapy evaluation was ordered and it was determined that patient is for home with no PT needs.  Patient was discharged to home in clinically stable condition. Vital signs were stable as well and lab values are stated below.  Patient has had an uneventful postoperative course.  Patient to have close follow up with Dr. Dietrich.     Upon discharge, the patient is ambulating and voiding spontaneously, tolerating oral intake, pain was well controlled with oral medication. Patient vital signs remained stable throughout post-operative course. All home care has been explained to patient and family.  Patient understands home instructions and all questions have been answered regarding home care and discharge.     Labs:             11.9   9.08  )-----------( 161      ( 08-11 @ 05:30 )             35.8     Vital Signs Last 24 Hrs  T(C): 37.1 (11 Aug 2023 09:24), Max: 37.1 (11 Aug 2023 09:24)  T(F): 98.8 (11 Aug 2023 09:24), Max: 98.8 (11 Aug 2023 09:24)  HR: 85 (11 Aug 2023 09:24) (81 - 93)  BP: 108/46 (11 Aug 2023 09:24) (103/60 - 149/71)  BP(mean): 77 (10 Aug 2023 20:00) (73 - 92)  RR: 16 (11 Aug 2023 09:24) (15 - 20)  SpO2: 97% (11 Aug 2023 09:24) (95% - 100%)    Parameters below as of 11 Aug 2023 09:24  Patient On (Oxygen Delivery Method): room air

## 2023-08-11 NOTE — PHYSICAL THERAPY INITIAL EVALUATION ADULT - DID THE PATIENT HAVE SURGERY?
Hysterectomy, total, robot-assisted, laparoscopic, using da Yajaira Xi, with bilateral salpingo-oophorectomy and sentinel lymph node/yes

## 2023-08-11 NOTE — PROGRESS NOTE ADULT - SUBJECTIVE AND OBJECTIVE BOX
R2 Gyn ONC Progress Note POD#1  HD#2    Subjective:   Pt seen and examined at bedside. No events overnight. Pain well controlled. Patient ambulating with assistance. Passing flatus with three BMs since surgery. Tolerating regular diet. Pt denies fever, chills, chest pain, SOB, nausea, vomiting, lightheadedness, dizziness.      Objective:  T(F): 97.7 (08-11-23 @ 05:25), Max: 98.4 (08-10-23 @ 19:00)  HR: 81 (08-11-23 @ 05:25) (79 - 93)  BP: 103/60 (08-11-23 @ 05:25) (103/60 - 149/71)  RR: 16 (08-11-23 @ 05:25) (15 - 20)  SpO2: 97% (08-11-23 @ 05:25) (95% - 100%)  Wt(kg): --  I&O's Summary    10 Aug 2023 07:01  -  11 Aug 2023 06:21  --------------------------------------------------------  IN: 270 mL / OUT: 985 mL / NET: -715 mL      CAPILLARY BLOOD GLUCOSE      POCT Blood Glucose.: 182 mg/dL (10 Aug 2023 21:12)  POCT Blood Glucose.: 191 mg/dL (10 Aug 2023 20:56)  POCT Blood Glucose.: 178 mg/dL (10 Aug 2023 17:46)      MEDICATIONS  (STANDING):  acetaminophen     Tablet .. 975 milliGRAM(s) Oral every 6 hours  chlorhexidine 2% Cloths 1 Application(s) Topical once  dextrose 5%. 1000 milliLiter(s) (100 mL/Hr) IV Continuous <Continuous>  dextrose 5%. 1000 milliLiter(s) (50 mL/Hr) IV Continuous <Continuous>  dextrose 50% Injectable 25 Gram(s) IV Push once  dextrose 50% Injectable 12.5 Gram(s) IV Push once  dextrose 50% Injectable 25 Gram(s) IV Push once  glucagon  Injectable 1 milliGRAM(s) IntraMuscular once  heparin   Injectable 5000 Unit(s) SubCutaneous every 8 hours  ibuprofen  Tablet. 600 milliGRAM(s) Oral every 6 hours  insulin lispro (ADMELOG) corrective regimen sliding scale   SubCutaneous three times a day before meals  insulin lispro (ADMELOG) corrective regimen sliding scale   SubCutaneous at bedtime  lactated ringers. 1000 milliLiter(s) (30 mL/Hr) IV Continuous <Continuous>  sodium chloride 0.9% lock flush 3 milliLiter(s) IV Push every 8 hours    MEDICATIONS  (PRN):  dextrose Oral Gel 15 Gram(s) Oral once PRN Blood Glucose LESS THAN 70 milliGRAM(s)/deciliter  metoprolol tartrate Injectable 5 milliGRAM(s) IV Push every 6 hours PRN SBP >/= 160 or DBP >/= 110  ondansetron    Tablet 8 milliGRAM(s) Oral every 8 hours PRN Nausea and/or Vomiting  oxyCODONE    IR 5 milliGRAM(s) Oral every 3 hours PRN Severe Pain (7 - 10)  senna 2 Tablet(s) Oral at bedtime PRN Constipation  simethicone 80 milliGRAM(s) Chew every 6 hours PRN Gas      Physical Exam:  Constitutional: NAD, A+O x3  CV: RR S1S2 no m/r/g  Lungs: CTA b/l, good air flow b/l  Abdomen: soft, softly-distended, appropriately-tender. no guarding, no rebound, normal bowel sounds  Incision: 4 lsc port sites with c/d/i op site dressing  Extremities: no lower extremity edema or calf tenderness bilaterally; venodynes in place

## 2023-08-11 NOTE — DISCHARGE NOTE PROVIDER - NSDCMRMEDTOKEN_GEN_ALL_CORE_FT
amlodipine-valsartan 5 mg-160 mg oral tablet: 1 tab(s) orally once a day  aspirin 81 mg oral tablet: 1 tab(s) orally once a day  glimepiride 1 mg oral tablet: 1 tab(s) orally once a day  ibuprofen 600 mg oral tablet: 1 tab(s) orally every 6 hours as needed for Pain  oxyCODONE 5 mg oral tablet: 1 tab(s) orally every 6 hours as needed for Breakthrough pain MDD: 20mg  simvastatin 20 mg oral tablet: 1 tab(s) orally once a day  solifenacin 5 mg oral tablet: 1 tab(s) orally once a day  Tylenol 325 mg oral tablet: 3 tab(s) orally every 6 hours as needed for Pain   amlodipine-valsartan 5 mg-160 mg oral tablet: 1 tab(s) orally once a day  aspirin 81 mg oral tablet: 1 tab(s) orally once a day  glimepiride 1 mg oral tablet: 1 tab(s) orally once a day  ibuprofen 600 mg oral tablet: 1 tab(s) orally every 6 hours as needed for Pain  oxyCODONE 5 mg oral tablet: 1 tab(s) orally every 6 hours as needed for Breakthrough pain MDD: 20mg  simethicone 80 mg oral tablet, chewable: 1 tab(s) orally 5 times a day as needed for  gas relief  simvastatin 20 mg oral tablet: 1 tab(s) orally once a day  solifenacin 5 mg oral tablet: 1 tab(s) orally once a day  Tylenol 325 mg oral tablet: 3 tab(s) orally every 6 hours as needed for Pain

## 2023-08-11 NOTE — DISCHARGE NOTE PROVIDER - NSDCFUADDINST_GEN_ALL_CORE_FT
Postoperative Instructions    Pain control: For pain control, take the followin. Motrin 600mg four times a day, take with food  2. Add Tylenol 975 four times a day, alternated with motrin  3. Add oxycodone as needed for severe pain not managed well by motrin and tylenol. A prescription has been sent to your pharmacy on file.   Motrin and Tylenol can be obtained over the counter.    Postoperative restrictions: Do not drive or make important decisions for 24 hours after anesthesia. You may feel drowsy for 24 hours and should have a responsible adult with you during that time. Nothing in the vagina (tampons, sexual intercourse), No tub baths, pools or hot tubs for 8 weeks (showers are ok!). No lifting anything heavier than 15 lbs, no strenuous exercise for 8 weeks after surgery. Do not pull or cut any stitches that you see around your incision.    Vaginal bleeding: Spotting and intermittent passage of blood clots per vagina is normal in first few weeks after surgery. If you are soaking 1 pad per hour, that is not normal and you should notify Dr. Dietrich's office and seek medical attention right away.    Vaginal discharge: Vaginal discharge (all colors) is normal after vaginal surgery. You may have vaginal discharge during this time. This is normal.    Signs of Infection: Some postoperative pain and discomfort is normal. However, if you experience any of the following, you may be developing an infection and should be seen by your doctor: pain that does not get better with the oral medications listed above, fever greater than 100.4F, foul smelling discharge coming from the surgical site, nausea and vomiting that does not stop (especially if you are unable to tolerate oral intake), or inability to urinate. If you experience any of these symptoms, call your doctor's office to be seen right away. 1. Return to your regular way of eating. Resume normal activity as tolerated, however No heavy lifting or strenuous activity for 6 weeks.  No driving for next 2 weeks and/or while on narcotic pain medication.  Complete vaginal rest, no tampons, no douching, no tub bathing, no emerging in swimming pools, no sexual activities for 6 weeks unless otherwise instructed by your doctor. Call your doctor with any signs and symptoms of infection such as fever, chills, nausea or vomiting.  Call your doctor with redness or swelling at the incision site, fluid leakage or wound separation.  Call your doctor if you're unable to tolerate food or have difficulty urinating.  Call your doctor if you have pain that is not relieved by your prescribed medications.  Notify your doctor with any other concerns.  2. Vaginal bleeding or spotting and intermittent passage of blood clots per vagina is normal in first few weeks after surgery. If you are soaking one (1) pad per hour, that is not normal and you should notify the office at [phone] and seek medical attention right away.  3. Vaginal discharge  (all colors) is normal after vaginal surgery. If you’ve had vaginal surgery, you have sutures in your vagina which take 3 months to fully absorb. You may have vaginal discharge during this time.  4. Some postoperative pain and discomfort is normal. However, if you experience any of the following, you may be developing an infection and should be seen by your doctor.  Pain that does not get better with the oral medications listed above, fever greater than 100.4F, foul smelling discharge coming from the surgical site, nausea and vomiting that does not stop (especially if you are unable to tolerate oral intake), or inability to urinate. If you experience any of these symptoms, call your doctor's office to be seen right away.  5. Please take Ibuprofen for moderate pain management. 600mg every 6 hours  6. Please take Tylenol for mild pain management 650mg every 6 hours  7. Please take oxycodone (ONE TABLET) every 6 hours for severe pain.

## 2023-08-11 NOTE — PHYSICAL THERAPY INITIAL EVALUATION ADULT - PERTINENT HX OF CURRENT PROBLEM, REHAB EVAL
Hysterectomy, total, robot-assisted, laparoscopic, using da Yajaira Xi, with bilateral salpingo-oophorectomy and sentinel lymph node

## 2023-08-11 NOTE — DISCHARGE NOTE PROVIDER - PROVIDER TOKENS
PROVIDER:[[id number]],SCHEDULEDAPPT:[08/25/2023],SCHEDULEDAPPTTIME:[02:00 PM]] PROVIDER:[[id number]],SCHEDULEDAPPT:[08/25/2023],SCHEDULEDAPPTTIME:[02:00 PM],ESTABLISHEDPATIENT:[T]]

## 2023-08-11 NOTE — DISCHARGE NOTE NURSING/CASE MANAGEMENT/SOCIAL WORK - PATIENT PORTAL LINK FT
You can access the FollowMyHealth Patient Portal offered by [facility] by registering at the following website: [email or web address]. By joining [employer]’s FollowMyHealth portal, you will also be able to view your health information using other applications (apps) compatible with our system.

## 2023-08-11 NOTE — PROGRESS NOTE ADULT - ASSESSMENT
Assessment/Plan: 82y POD#1, s/p RA-TLH, BSO, SLNMD for endometrial carcinoma FIGO grade 1. Procedure uncomplicated but notable for lymph node dissection close to ureter, UOP adequate overnight and pt voiding spontaneously. Pt clinically and hemodynamically stable, meeting post-operative milestones appropriately.     Neuro: Continue Tylenol, Motrin, Oxy PRN for pain  CV: Hemodynamically stable, f/u AM CBC  - Lopressor PRN for h/o HTN  Pulm: Saturating well on RA. Incentive spirometer at bedside  GI: Tolerating regular diet  - Zofran PRN, Senna, Simethicone  : Voiding spontaneously, adequate UOP overnight  Heme: Continue HSQ/Venodynes for DVT ppx. Increase OOB.    FEN: SLIV  - f/u AM BMP/Mg/Phos, replete lytes PRN  ID: Afebrile  Endo: ISS   Dispo: continue inpatient care, PT consult pending for h/o osteoarthritis    VTimmel PGY2   Acute Abdominal Pain    WHAT YOU NEED TO KNOW:    The cause of your abdominal pain may not be found. If a cause is found, treatment will depend on what the cause is.     DISCHARGE INSTRUCTIONS:    Return to the emergency department if:     You vomit blood or cannot stop vomiting.      You have blood in your bowel movement or it looks like tar.       You have bleeding from your rectum.       Your abdomen is larger than usual, more painful, and hard.       You have severe pain in your abdomen.       You stop passing gas and having bowel movements.       You feel weak, dizzy, or faint.    Contact your healthcare provider if:     You have a fever.      You have new signs and symptoms.      Your symptoms do not get better with treatment.       You have questions or concerns about your condition or care.    Medicines may be given to decrease pain, treat an infection, and manage your symptoms. Take your medicine as directed. Call your healthcare provider if you think your medicine is not helping or if you have side effects. Tell him if you are allergic to any medicine. Keep a list of the medicines, vitamins, and herbs you take. Include the amounts, and when and why you take them. Bring the list or the pill bottles to follow-up visits. Carry your medicine list with you in case of an emergency.    Manage your symptoms:     Apply heat on your abdomen for 20 to 30 minutes every 2 hours for as many days as directed. Heat helps decrease pain and muscle spasms.       Manage your stress. Stress may cause abdominal pain. Your healthcare provider may recommend relaxation techniques and deep breathing exercises to help decrease your stress. Your healthcare provider may recommend you talk to someone about your stress or anxiety, such as a counselor or a trusted friend. Get plenty of sleep and exercise regularly.       Limit or do not drink alcohol. Alcohol can make your abdominal pain worse. Ask your healthcare provider if it is safe for you to drink alcohol. Also ask how much is safe for you to drink.       Do not smoke. Nicotine and other chemicals in cigarettes can damage your esophagus and stomach. Ask your healthcare provider for information if you currently smoke and need help to quit. E-cigarettes or smokeless tobacco still contain nicotine. Talk to your healthcare provider before you use these products.     Make changes to the food you eat as directed: Do not eat foods that cause abdominal pain or other symptoms. Eat small meals more often.     Eat more high-fiber foods if you are constipated. High-fiber foods include fruits, vegetables, whole-grain foods, and legumes.       Do not eat foods that cause gas if you have bloating. Examples include broccoli, cabbage, and cauliflower. Do not drink soda or carbonated drinks, because these may also cause gas.       Do not eat foods or drinks that contain sorbitol or fructose if you have diarrhea and bloating. Some examples are fruit juices, candy, jelly, and sugar-free gum.       Do not eat high-fat foods, such as fried foods, cheeseburgers, hot dogs, and desserts.      Limit or do not drink caffeine. Caffeine may make symptoms, such as heart burn or nausea, worse.       Drink plenty of liquids to prevent dehydration from diarrhea or vomiting. Ask your healthcare provider how much liquid to drink each day and which liquids are best for you.     Follow up with your healthcare provider as directed: Write down your questions so you remember to ask them during your visits.

## 2023-08-11 NOTE — PHYSICAL THERAPY INITIAL EVALUATION ADULT - ADDITIONAL COMMENTS
Pt lives in a home 2 steps to enter with her adult children and grandchildren.  No history of falls.  Fully independent at baseline.

## 2023-08-11 NOTE — DISCHARGE NOTE NURSING/CASE MANAGEMENT/SOCIAL WORK - NSDCPNINST_GEN_ALL_CORE
Notify MD if experiencing fever, nausea, vomiting, increased pain, redness, drainage or bleeding from incisions.

## 2023-08-11 NOTE — PROGRESS NOTE ADULT - PROBLEM SELECTOR PLAN 1
Gyn Onc Fellow Addendum:    Pt seen and examined at bedside. Agree with above. Pain well controlled. Ambulating, voiding spontaneously. Tolerating PO without nausea/vomiting.     VS reviewed  Labs reviewed    Hemodynamically stable   Continue current pain regimen  Reg diet   Encourage ambulation and IS use  Replete lytes prn  PT consult pending   DVT ppx: HSQ, Sushma   Dispo: anticipate discharge home today     Barbara Cisse MD

## 2023-08-11 NOTE — DISCHARGE NOTE PROVIDER - NSDCACTIVITY_GEN_ALL_CORE
Showering allowed/Walking - Indoors allowed/No heavy lifting/straining/Follow Instructions Provided by your Surgical Team Showering allowed/Stairs allowed/Walking - Indoors allowed/No heavy lifting/straining/Walking - Outdoors allowed/Follow Instructions Provided by your Surgical Team

## 2023-08-11 NOTE — DISCHARGE NOTE PROVIDER - CARE PROVIDER_API CALL
Minnie Dietrich  Gynecologic Oncology  [address] [place] [id number]  Phone: [phone]  Fax: [phone]  Scheduled Appointment: 08/25/2023 02:00 PM   Minnie Dietrich  Gynecologic Oncology  [address] [place] [id number]  Phone: [phone]  Fax: [phone]  Established Patient  Scheduled Appointment: 08/25/2023 02:00 PM

## 2023-08-14 ENCOUNTER — NON-APPOINTMENT (OUTPATIENT)
Age: 83
End: 2023-08-14

## 2023-08-14 LAB — GLUCOSE BLDC GLUCOMTR-MCNC: 91 MG/DL — SIGNIFICANT CHANGE UP (ref 70–99)

## 2023-08-17 LAB — SURGICAL PATHOLOGY STUDY: SIGNIFICANT CHANGE UP

## 2023-08-28 ENCOUNTER — APPOINTMENT (OUTPATIENT)
Dept: GYNECOLOGIC ONCOLOGY | Facility: CLINIC | Age: 83
End: 2023-08-28
Payer: MEDICARE

## 2023-08-28 VITALS
BODY MASS INDEX: 27.46 KG/M2 | DIASTOLIC BLOOD PRESSURE: 66 MMHG | TEMPERATURE: 98.1 F | SYSTOLIC BLOOD PRESSURE: 118 MMHG | HEART RATE: 82 BPM | HEIGHT: 63 IN | WEIGHT: 155 LBS

## 2023-08-28 PROBLEM — M19.90 UNSPECIFIED OSTEOARTHRITIS, UNSPECIFIED SITE: Chronic | Status: ACTIVE | Noted: 2023-08-03

## 2023-08-28 PROBLEM — E04.1 NONTOXIC SINGLE THYROID NODULE: Chronic | Status: ACTIVE | Noted: 2023-08-03

## 2023-08-28 PROBLEM — C54.1 MALIGNANT NEOPLASM OF ENDOMETRIUM: Chronic | Status: ACTIVE | Noted: 2023-08-03

## 2023-08-28 PROCEDURE — 99024 POSTOP FOLLOW-UP VISIT: CPT

## 2023-08-29 RX ORDER — SOLIFENACIN SUCCINATE 5 MG/1
5 TABLET ORAL
Qty: 90 | Refills: 0 | Status: ACTIVE | COMMUNITY
Start: 2023-06-20

## 2023-08-29 RX ORDER — OXYCODONE 5 MG/1
5 TABLET ORAL
Qty: 8 | Refills: 0 | Status: ACTIVE | COMMUNITY
Start: 2023-08-11

## 2023-08-29 RX ORDER — AMLODIPINE AND VALSARTAN 5; 160 MG/1; MG/1
5-160 TABLET, FILM COATED ORAL
Qty: 90 | Refills: 0 | Status: ACTIVE | COMMUNITY
Start: 2023-06-20

## 2023-08-29 RX ORDER — METRONIDAZOLE 7.5 MG/G
0.75 GEL VAGINAL
Qty: 70 | Refills: 0 | Status: ACTIVE | COMMUNITY
Start: 2023-03-18

## 2023-08-29 RX ORDER — SITAGLIPTIN 100 MG/1
100 TABLET, FILM COATED ORAL
Qty: 90 | Refills: 0 | Status: ACTIVE | COMMUNITY
Start: 2023-06-20

## 2023-08-29 RX ORDER — GLIMEPIRIDE 1 MG/1
1 TABLET ORAL
Qty: 90 | Refills: 0 | Status: ACTIVE | COMMUNITY
Start: 2023-06-20

## 2023-08-29 RX ORDER — TERCONAZOLE 4 MG/G
0.4 CREAM VAGINAL
Qty: 45 | Refills: 0 | Status: ACTIVE | COMMUNITY
Start: 2023-06-20

## 2023-08-29 RX ORDER — CIPROFLOXACIN HYDROCHLORIDE 500 MG/1
500 TABLET, FILM COATED ORAL
Qty: 20 | Refills: 0 | Status: ACTIVE | COMMUNITY
Start: 2023-06-20

## 2023-08-29 RX ORDER — SIMVASTATIN 20 MG/1
20 TABLET, FILM COATED ORAL
Qty: 90 | Refills: 0 | Status: ACTIVE | COMMUNITY
Start: 2023-06-20

## 2023-08-29 RX ORDER — BLOOD SUGAR DIAGNOSTIC
STRIP MISCELLANEOUS
Qty: 100 | Refills: 0 | Status: ACTIVE | COMMUNITY
Start: 2023-06-20

## 2023-08-29 NOTE — DISCUSSION/SUMMARY
[Clean] : was clean [Dry] : was dry [Intact] : was intact [Erythema] : was not erythematous [Ecchymosis] : was not ecchymotic [None] : had no drainage [Normal Skin] : normal appearance [Normal Skin Turgor] : normal skin turgor [Firm] : soft [Tender] : nontender [Rebound] : no rebound tenderness [Guarding] : no guarding [Doing Well] : is doing well [Excellent Pain Control] : has excellent pain control [No Sign of Infection] : is showing no signs of infection [0] : 0 [Reviewed] : reviewed [de-identified] : deferred VB [de-identified] : gu/gi function wnl [de-identified] : postoperative recuperation discussed [FreeTextEntry1] : Healing well after RTLH, BSO and staging. Discussed ongoing recuperation. Reviewed Final pathology results in detail. A copy was given to the patient. She meets low risk criteria with associated risk recurrence with majority of recurrences being vaginal. She does not meet criteria for adjuvant radiotherapy. We discussed signs and symptoms of EMCA. We reviewed recommended surveillance plan. Patient stated and expressed a good understanding of the above information.  Follow up in 6 months RTO in 4 weeks for second post op

## 2023-08-29 NOTE — REASON FOR VISIT
[Post Op] : post op visit [de-identified] : 8/10/2022 [de-identified] : Exam under anesthesia, robotic-assisted total laparoscopic hysterectomy, bilateral salpingo-oophorectomy, and sentinel lymph node mapping. [de-identified] : Normal bowel function. Normal bladder function. No vaginal bleeding or malodorous discharge. No fevers/chills.  Incisions without concern. Recovering well.  Mild umbilical incisional pain.

## 2023-08-30 ENCOUNTER — NON-APPOINTMENT (OUTPATIENT)
Age: 83
End: 2023-08-30

## 2023-10-06 ENCOUNTER — APPOINTMENT (OUTPATIENT)
Dept: GYNECOLOGIC ONCOLOGY | Facility: CLINIC | Age: 83
End: 2023-10-06
Payer: MEDICARE

## 2023-10-06 VITALS
HEART RATE: 84 BPM | HEIGHT: 63 IN | DIASTOLIC BLOOD PRESSURE: 74 MMHG | WEIGHT: 155 LBS | TEMPERATURE: 98.4 F | BODY MASS INDEX: 27.46 KG/M2 | SYSTOLIC BLOOD PRESSURE: 132 MMHG

## 2023-10-06 PROCEDURE — 99024 POSTOP FOLLOW-UP VISIT: CPT

## 2024-03-01 ENCOUNTER — APPOINTMENT (OUTPATIENT)
Dept: GYNECOLOGIC ONCOLOGY | Facility: CLINIC | Age: 84
End: 2024-03-01
Payer: MEDICARE

## 2024-04-05 ENCOUNTER — APPOINTMENT (OUTPATIENT)
Dept: GYNECOLOGIC ONCOLOGY | Facility: CLINIC | Age: 84
End: 2024-04-05
Payer: MEDICARE

## 2024-04-05 VITALS
BODY MASS INDEX: 28 KG/M2 | WEIGHT: 158 LBS | DIASTOLIC BLOOD PRESSURE: 74 MMHG | HEART RATE: 85 BPM | HEIGHT: 63 IN | SYSTOLIC BLOOD PRESSURE: 127 MMHG

## 2024-04-05 DIAGNOSIS — C54.1 MALIGNANT NEOPLASM OF ENDOMETRIUM: ICD-10-CM

## 2024-04-05 PROCEDURE — 99213 OFFICE O/P EST LOW 20 MIN: CPT

## 2024-04-05 NOTE — HISTORY OF PRESENT ILLNESS
[FreeTextEntry1] : IA gr1 endometrial cancer  Robotic TLH/BSO/SLN8/10/23  Last seen 10/2023  Denies abdominal/pelvic pain, dyspnea or chest pain, vaginal bleeding or discharge, nausea/vomiting, changes in bowel habits or urination, lower extremity edema or pain.

## 2024-10-07 ENCOUNTER — APPOINTMENT (OUTPATIENT)
Dept: GYNECOLOGIC ONCOLOGY | Facility: CLINIC | Age: 84
End: 2024-10-07

## 2024-10-07 DIAGNOSIS — C54.1 MALIGNANT NEOPLASM OF ENDOMETRIUM: ICD-10-CM

## (undated) DEVICE — POSITIONER PINK PAD PIGAZZI SYSTEM

## (undated) DEVICE — RADIOGRAPHY DVC SPEC TRANSPEC

## (undated) DEVICE — XI ARM FORCEP TENACULUM

## (undated) DEVICE — UTERINE MANIPULATOR CONMED VCARE MED 34MM

## (undated) DEVICE — XI ARM NEEDLE DRIVER LARGE

## (undated) DEVICE — XI DRAPE COLUMN

## (undated) DEVICE — DRAPE CHEST BREAST 106" X 122"

## (undated) DEVICE — PACK PERI GYN

## (undated) DEVICE — XI ARM FORCEP FENESTRATED BIPOLAR 8MM

## (undated) DEVICE — XI ARM FORCEP MARYLAND BIPOLAR

## (undated) DEVICE — PACK D&C

## (undated) DEVICE — XI TIP COVER

## (undated) DEVICE — ENDOCATCH 10MM SPECIMEN POUCH

## (undated) DEVICE — GLV 5.5 PROTEXIS (WHITE)

## (undated) DEVICE — DRSG OPSITE 13.75 X 4"

## (undated) DEVICE — DRSG TEGADERM 4X4.75"

## (undated) DEVICE — D HELP - CLEARVIEW CLEARIFY SYSTEM

## (undated) DEVICE — UTERINE MANIPULATOR CONMED VCARE LG 37MM

## (undated) DEVICE — GLV 6 PROTEXIS (WHITE)

## (undated) DEVICE — SUT MONOCRYL 3-0 18" PS-2 UNDYED

## (undated) DEVICE — DRSG TELFA .5 X 3

## (undated) DEVICE — PACK ROBOTIC LIJ

## (undated) DEVICE — GOWN XL

## (undated) DEVICE — TROCAR SURGIQUEST AIRSEAL 8MMX100MM

## (undated) DEVICE — FOLEY TRAY 16FR 5CC LF UMETER CLOSED

## (undated) DEVICE — DRSG STERISTRIPS 0.5 X 4"

## (undated) DEVICE — LUBRICATING JELLY ONESHOT 1.25OZ

## (undated) DEVICE — SUT MONOCRYL 4-0 27" PS-2 UNDYED

## (undated) DEVICE — SUT CHROMIC 2-0 36" CT-1

## (undated) DEVICE — DRAPE 3/4 SHEET 52X76"

## (undated) DEVICE — XI ARM FORCEP PROGRASP 8MM

## (undated) DEVICE — POSITIONER FOAM HEAD CRADLE (PINK)

## (undated) DEVICE — UTERINE MANIPULATOR CONMED VCARE SM 32MM

## (undated) DEVICE — XI ARM PERMANENT CAUTERY SPATULA

## (undated) DEVICE — XI DRAPE ARM

## (undated) DEVICE — XI ARM GRASPER TIP UP FENESTRATED

## (undated) DEVICE — POSITIONER PURPLE ARM ONE STEP (LARGE)

## (undated) DEVICE — SUT VICRYL 0 27" CT-2 UNDYED

## (undated) DEVICE — XI ARM PERMANENT CAUTERY HOOK

## (undated) DEVICE — DRAPE TOWEL BLUE 17" X 24"

## (undated) DEVICE — XI OBTURATOR OPTICAL BLADELESS 8MM

## (undated) DEVICE — ELCTR BOVIE TIP BLADE INSULATED 2.75" EDGE

## (undated) DEVICE — ELCTR GROUNDING PAD ADULT COVIDIEN

## (undated) DEVICE — GOWN XXXL

## (undated) DEVICE — POSITIONER STRAP ARMBOARD VELCRO TS-30

## (undated) DEVICE — TUBING AIRSEAL TRI-LUMEN FILTERED

## (undated) DEVICE — VENODYNE/SCD SLEEVE CALF MEDIUM

## (undated) DEVICE — ELCTR BOVIE PENCIL SMOKE EVACUATION

## (undated) DEVICE — GLV 6.5 PROTEXIS (WHITE)

## (undated) DEVICE — SUT CHROMIC 2-0 27" CT-1

## (undated) DEVICE — SYR LUER LOK 10CC

## (undated) DEVICE — SUT VICRYL 0 27" UR-6

## (undated) DEVICE — GLV 7 PROTEXIS (WHITE)

## (undated) DEVICE — WARMING BLANKET LOWER ADULT

## (undated) DEVICE — TUBING STRYKEFLOW II SUCTION / IRRIGATOR

## (undated) DEVICE — XI ARM SCISSOR MONO CURVED

## (undated) DEVICE — XI SEAL UNIV 5- 8 MM

## (undated) DEVICE — SUT SILK 2-0 30" SH

## (undated) DEVICE — GOWN LG

## (undated) DEVICE — UTERINE MANIPULATOR MPM MEDICAL ZUMI 4.5MM

## (undated) DEVICE — SUT VLOC 180 3-0 9" V-20 GREEN

## (undated) DEVICE — PROTECTOR HEEL / ELBOW

## (undated) DEVICE — SOL IRR POUR NS 0.9% 500ML